# Patient Record
Sex: MALE | Race: WHITE
[De-identification: names, ages, dates, MRNs, and addresses within clinical notes are randomized per-mention and may not be internally consistent; named-entity substitution may affect disease eponyms.]

---

## 2018-08-15 ENCOUNTER — HOSPITAL ENCOUNTER (INPATIENT)
Dept: HOSPITAL 4 - SED | Age: 68
LOS: 3 days | Discharge: HOME | DRG: 74 | End: 2018-08-18
Attending: INTERNAL MEDICINE | Admitting: INTERNAL MEDICINE
Payer: COMMERCIAL

## 2018-08-15 VITALS — SYSTOLIC BLOOD PRESSURE: 135 MMHG

## 2018-08-15 VITALS — SYSTOLIC BLOOD PRESSURE: 175 MMHG

## 2018-08-15 VITALS — HEIGHT: 70 IN | BODY MASS INDEX: 33.07 KG/M2 | WEIGHT: 231 LBS

## 2018-08-15 DIAGNOSIS — N18.2: ICD-10-CM

## 2018-08-15 DIAGNOSIS — E66.01: ICD-10-CM

## 2018-08-15 DIAGNOSIS — R13.10: ICD-10-CM

## 2018-08-15 DIAGNOSIS — Z82.3: ICD-10-CM

## 2018-08-15 DIAGNOSIS — E11.42: ICD-10-CM

## 2018-08-15 DIAGNOSIS — E87.6: ICD-10-CM

## 2018-08-15 DIAGNOSIS — H53.2: ICD-10-CM

## 2018-08-15 DIAGNOSIS — E78.5: ICD-10-CM

## 2018-08-15 DIAGNOSIS — I12.9: ICD-10-CM

## 2018-08-15 DIAGNOSIS — E11.22: ICD-10-CM

## 2018-08-15 DIAGNOSIS — H49.00: ICD-10-CM

## 2018-08-15 DIAGNOSIS — Z79.899: ICD-10-CM

## 2018-08-15 DIAGNOSIS — G70.89: Primary | ICD-10-CM

## 2018-08-15 DIAGNOSIS — Z88.0: ICD-10-CM

## 2018-08-15 DIAGNOSIS — Z90.49: ICD-10-CM

## 2018-08-15 DIAGNOSIS — R20.2: ICD-10-CM

## 2018-08-15 DIAGNOSIS — Z86.010: ICD-10-CM

## 2018-08-15 DIAGNOSIS — Z98.49: ICD-10-CM

## 2018-08-15 DIAGNOSIS — M19.90: ICD-10-CM

## 2018-08-15 DIAGNOSIS — K76.0: ICD-10-CM

## 2018-08-15 DIAGNOSIS — I49.1: ICD-10-CM

## 2018-08-15 DIAGNOSIS — N40.0: ICD-10-CM

## 2018-08-15 DIAGNOSIS — M50.30: ICD-10-CM

## 2018-08-15 DIAGNOSIS — Z79.82: ICD-10-CM

## 2018-08-15 LAB
ALBUMIN SERPL BCP-MCNC: 4.1 G/DL (ref 3.4–4.8)
ALT SERPL W P-5'-P-CCNC: 45 U/L (ref 12–78)
ANION GAP SERPL CALCULATED.3IONS-SCNC: 7 MMOL/L (ref 5–15)
AST SERPL W P-5'-P-CCNC: 34 U/L (ref 10–37)
BASOPHILS # BLD AUTO: 0 K/UL (ref 0–0.2)
BASOPHILS NFR BLD AUTO: 0.4 % (ref 0–2)
BILIRUB SERPL-MCNC: 1 MG/DL (ref 0–1)
BUN SERPL-MCNC: 11 MG/DL (ref 8–21)
CALCIUM SERPL-MCNC: 9.4 MG/DL (ref 8.4–11)
CHLORIDE SERPL-SCNC: 106 MMOL/L (ref 98–107)
CREAT SERPL-MCNC: 0.89 MG/DL (ref 0.55–1.3)
EOSINOPHIL # BLD AUTO: 0.2 K/UL (ref 0–0.4)
EOSINOPHIL NFR BLD AUTO: 3.8 % (ref 0–4)
ERYTHROCYTE [DISTWIDTH] IN BLOOD BY AUTOMATED COUNT: 13.8 % (ref 9–15)
ERYTHROCYTE [SEDIMENTATION RATE] IN BLOOD BY WESTERGREN METHOD: 6 MM/HR (ref 0–15)
GFR SERPL CREATININE-BSD FRML MDRD: 109 ML/MIN (ref 90–?)
GLUCOSE SERPL-MCNC: 93 MG/DL (ref 70–99)
HCT VFR BLD AUTO: 45.6 % (ref 36–54)
HGB BLD-MCNC: 15.7 G/DL (ref 14–18)
INR PPP: 1.1 (ref 0.8–1.2)
LYMPHOCYTES # BLD AUTO: 1.7 K/UL (ref 1–5.5)
LYMPHOCYTES NFR BLD AUTO: 33.4 % (ref 20.5–51.5)
MCH RBC QN AUTO: 30 PG (ref 27–31)
MCHC RBC AUTO-ENTMCNC: 34 % (ref 32–36)
MCV RBC AUTO: 87 FL (ref 79–98)
MONOCYTES # BLD MANUAL: 0.4 K/UL (ref 0–1)
MONOCYTES # BLD MANUAL: 8 % (ref 1.7–9.3)
NEUTROPHILS # BLD AUTO: 2.7 K/UL (ref 1.8–7.7)
NEUTROPHILS NFR BLD AUTO: 54.4 % (ref 40–70)
PLATELET # BLD AUTO: 190 K/UL (ref 130–430)
POTASSIUM SERPL-SCNC: 3 MMOL/L (ref 3.5–5.1)
PROTHROMBIN TIME: 10.9 SECS (ref 9.5–12.5)
RBC # BLD AUTO: 5.24 MIL/UL (ref 4.2–6.2)
SODIUM SERPLBLD-SCNC: 143 MMOL/L (ref 136–145)
WBC # BLD AUTO: 5 K/UL (ref 4.8–10.8)

## 2018-08-15 NOTE — NUR
NEURO CHECK DONE



PATIENT WITH DROOPING OF THE LEFT EYE, PATIENT ABLE TO TALK WITH NO SLURRING OF SPEECH, ABLE 
TO TURN HEAD FROM SIDE TO SIDE WITH A LITTLE DISCOMFORT NAD PAIN LEVEL OF 2/10, AND STATED 
THAT IT IS TOLERABLE. UPPER AND LOWER EXTREMITIES WITH EQUAL  AND STRENGTH, CAN EXTEND 
AND FLEX WITHOUT PAIN DIFFICULTY. PATIENT ABLE TO WALK WITH STEADY GAIT.

## 2018-08-15 NOTE — NUR
RN ROUNDS



PATIENT ON BED SLEEPING AND RESTING BREATHING AND UNLABORED ON STABLE CONDITION NO 
COMPLAINTS NOTED WILL CONTINUE TO MONITOR CALL LIGHT WITHIN REACH.

## 2018-08-15 NOTE — NUR
ADMISSION NOTE

Received patient from ER via ben, received report from ER RN. Patient admitted with 
diagnosis of diplopia and dysarthia. Able to ambulate from doorway to bed independently 
without assistance. Patient's wife at the bedside with the patient. Patient oriented to 
hospital routine, call light, toileting and safety-patient verbalized understanding.

## 2018-08-15 NOTE — NUR
ADMISSION NOTE



RECEIVED ADMISSION REPORT FROM ADMISSION NURSE. PATIENT AWAKE AND ALERT, ABLE TO VERBALIZE 
NEEDS A/0X4, BREATHING EVEN AND UNLABORED, NO PAIN NOTED, NEUROLOGIC ASSESSMENT DONE, 
PATIENT ON STABLE CONDITION. EXPLAINED THE PLAN OF CARE AND STATED UNDERSTANDING, WITH IV ON 
THE RAC 20G. ORIENTED TO THE FACILITY AND POLICIES, WILL CONTINUE TO MONITOR CALL LIGHT 
WITHIN REACH

## 2018-08-15 NOTE — NUR
Pt AAOx4 presents to ED c/o weakness to facial muscles x 2-3 weeks. Pt was 
referred to ED by PMD. Pt denies pain, but states "Everytime I chew my food, I 
take a couple bites and I'm too tired to continue eating." Skin pink dry and 
warm, breathing even and unlabored. No other injuries/complaints per pt/noted. 
Will continue to monitor.

## 2018-08-15 NOTE — NUR
Patient will be admitted to [facility].  Admitted to Med Surg unit.  Will 
go to room 116B.  Belongings list completed.  Summary report printed. Report 
will be given at bedside.

## 2018-08-16 VITALS — SYSTOLIC BLOOD PRESSURE: 156 MMHG

## 2018-08-16 VITALS — SYSTOLIC BLOOD PRESSURE: 155 MMHG

## 2018-08-16 VITALS — SYSTOLIC BLOOD PRESSURE: 160 MMHG

## 2018-08-16 VITALS — SYSTOLIC BLOOD PRESSURE: 170 MMHG

## 2018-08-16 VITALS — SYSTOLIC BLOOD PRESSURE: 149 MMHG

## 2018-08-16 LAB
T4 FREE SERPL-MCNC: 0.7 NG/DL (ref 0.6–1.6)
TSH SERPL DL<=0.05 MIU/L-ACNC: 1.13 UIU/ML (ref 0.34–4.82)

## 2018-08-16 RX ADMIN — GABAPENTIN SCH MG: 300 CAPSULE ORAL at 21:15

## 2018-08-16 RX ADMIN — POTASSIUM CHLORIDE SCH MEQ: 20 TABLET, EXTENDED RELEASE ORAL at 10:17

## 2018-08-16 RX ADMIN — Medication SCH UNIT: at 21:15

## 2018-08-16 RX ADMIN — VALSARTAN SCH MG: 160 TABLET ORAL at 10:18

## 2018-08-16 RX ADMIN — Medication SCH UNIT: at 10:17

## 2018-08-16 RX ADMIN — PYRIDOSTIGMINE BROMIDE SCH MG: 60 TABLET ORAL at 21:15

## 2018-08-16 RX ADMIN — PYRIDOSTIGMINE BROMIDE SCH MG: 60 TABLET ORAL at 17:46

## 2018-08-16 RX ADMIN — DEXTROSE AND SODIUM CHLORIDE SCH MLS/HR: 5; 450 INJECTION, SOLUTION INTRAVENOUS at 11:59

## 2018-08-16 RX ADMIN — METOPROLOL SUCCINATE SCH MG: 50 TABLET, EXTENDED RELEASE ORAL at 10:18

## 2018-08-16 RX ADMIN — ATORVASTATIN CALCIUM SCH MG: 10 TABLET, FILM COATED ORAL at 10:17

## 2018-08-16 RX ADMIN — DOCUSATE SODIUM SCH MG: 100 CAPSULE, LIQUID FILLED ORAL at 10:17

## 2018-08-16 RX ADMIN — DEXTROSE AND SODIUM CHLORIDE SCH MLS/HR: 5; 450 INJECTION, SOLUTION INTRAVENOUS at 01:30

## 2018-08-16 RX ADMIN — PYRIDOSTIGMINE BROMIDE SCH MG: 60 TABLET ORAL at 13:44

## 2018-08-16 RX ADMIN — ASPIRIN SCH MG: 81 TABLET, COATED ORAL at 10:17

## 2018-08-16 RX ADMIN — POTASSIUM CHLORIDE SCH MEQ: 20 TABLET, EXTENDED RELEASE ORAL at 21:14

## 2018-08-16 NOTE — NUR
Closing Note

Patient is currently resting in bed. IV is on the RAC20g running D51/2NS@90. Patient has 
been in stable condition throughout the shift. Call light is within reach and bed is in low 
position. Will endorse care to the oncoming nurse.

## 2018-08-16 NOTE — NUR
RN ROUNDS



PATIENT ON BED SLEEPING AND RESTING, ON STABLE CONDITION, NO COMPLAINTS NOTED AS OF NOW, 
WILL CONTINUE TO MONITOR. CALL LIGHT WITHIN REACH.

## 2018-08-16 NOTE — NUR
RN ROUNDS



PATIENT SLEEPING AND RESTING, ON STABLE CONDITION, BREATHING EVEN AND UNLABORED, WILL 
CONTINUE TO MONITOR.

## 2018-08-16 NOTE — NUR
Rounds

Patient is currently resting in bed. No signs distress noted at the moment. Will continue to 
monitor.

## 2018-08-16 NOTE — NUR
MED PASS



DUE MEDICATIONS GIVEN. ASPIRATION PRECAUTIONS OBSERVED. DENIES ANY PAIN. CALL LIGHT WITHIN 
EASY REACH.

## 2018-08-16 NOTE — NUR
Cardiac consult called:

for Dr. Brian, regarding abnormal EKG, possible CVA, ordered by Dr. Lee, spoke with 
Oliver.

## 2018-08-16 NOTE — NUR
OPENING NOTES



RECEIVED PATIENT IN BED AAO X4. BREATHING UNLABORED ON ROOM AIR. DENIES ANY PAIN. IVF 
INFUSING AS ORDERED. IV LINE INTACT TO RT AC. PLAN OF CARE REVIEWED WITH PATIENT. CALL LIGHT 
WITHIN EASY REACH. BED IN LOWEST LOCKED POSITION.

## 2018-08-16 NOTE — NUR
RN ROUNDS/NEURO CHECK



PATIENT ON BED SLEEPING AND RESTING, BREATHING EVEN AND UNLABORED, NO SOB NOTED, NEURO CHECK 
DONE SAFETY MAINTAINED CALL LIGHT WITHIN REACH.

## 2018-08-16 NOTE — NUR
Opening Note

Report received from Surinder ROBERTS. Patient is currently resting in bed. No signs of facial 
paralysis noted at the moment. IV is on the RAC 20g saline locked. No signs of distress 
noted. Call light is within reach and bed is in low position. Will continue to monitor.

## 2018-08-16 NOTE — NUR
PATIENT AMBULATED TO THE BATHROOM



PATIENT AMBULATED TO THE BATHROOM TOLERATING WELL WITH STEADY GAIT, UPPER AND LOWER 
EXTREMITIES WITH ACTIVE ROM, STILL WITH FACIAL DROOP ON THE LEFT SIDE OF THE FACE, SPEECH IS 
CLEAR AND UNDERSTANDABLE, ABLE TO MAKE NEEDS KNOWN.

## 2018-08-16 NOTE — NUR
DR. UGALDE



SPOKE WITH DR UGALDE REGARDING ORDER FOR FINGER STICK GLUCOSE. MD ORDERED AC/HS. PER MD 
PATIENT HAS SINUS INFECTION ORDERED TO START LEVAQUIN 500 MG PO DAILY.

## 2018-08-16 NOTE — NUR
CLOSING NOTES



PATIENT ON BED SLEEPING AND RESTING, BREATHING EVEN AND UNLABORED, MAINTAINED POSITION OF 
COMFORT AND SAFETY BED ON THE LOWEST POSITION, BED ALARM MAINTAINED. ENCOURAGED DEEP 
BREATHING AND RELAXATION, NO PAIN NOTED, EDUCATION GIVEN, NEURO CHECKS DONE. IV CLEAN DRY 
INTACT. WILL GIVE REPORT TO AM NURSE, WILL CONTINUE TO MONITOR CALL LIGHT WITHIN REACH

## 2018-08-16 NOTE — NUR
Rounds

Patient is sitting up in bed. Swallow eval is pending and will be done tomorrow. Call light 
is within reach and bed is in low position.

## 2018-08-17 VITALS — SYSTOLIC BLOOD PRESSURE: 152 MMHG

## 2018-08-17 VITALS — SYSTOLIC BLOOD PRESSURE: 124 MMHG

## 2018-08-17 VITALS — SYSTOLIC BLOOD PRESSURE: 142 MMHG

## 2018-08-17 VITALS — SYSTOLIC BLOOD PRESSURE: 149 MMHG

## 2018-08-17 VITALS — SYSTOLIC BLOOD PRESSURE: 157 MMHG

## 2018-08-17 LAB
ANION GAP SERPL CALCULATED.3IONS-SCNC: 6 MMOL/L (ref 5–15)
BUN SERPL-MCNC: 7 MG/DL (ref 8–21)
CALCIUM SERPL-MCNC: 9.1 MG/DL (ref 8.4–11)
CHLORIDE SERPL-SCNC: 108 MMOL/L (ref 98–107)
CREAT SERPL-MCNC: 0.73 MG/DL (ref 0.55–1.3)
GFR SERPL CREATININE-BSD FRML MDRD: 137 ML/MIN (ref 90–?)
GLUCOSE SERPL-MCNC: 149 MG/DL (ref 70–99)
POTASSIUM SERPL-SCNC: 2.9 MMOL/L (ref 3.5–5.1)
SODIUM SERPLBLD-SCNC: 141 MMOL/L (ref 136–145)

## 2018-08-17 RX ADMIN — Medication SCH UNIT: at 20:48

## 2018-08-17 RX ADMIN — Medication SCH UNIT: at 08:58

## 2018-08-17 RX ADMIN — DEXTROSE AND SODIUM CHLORIDE SCH MLS/HR: 5; 450 INJECTION, SOLUTION INTRAVENOUS at 06:26

## 2018-08-17 RX ADMIN — ASPIRIN SCH MG: 81 TABLET, COATED ORAL at 08:59

## 2018-08-17 RX ADMIN — TAMSULOSIN HYDROCHLORIDE SCH MG: 0.4 CAPSULE ORAL at 09:01

## 2018-08-17 RX ADMIN — Medication SCH EA: at 11:31

## 2018-08-17 RX ADMIN — PANTOPRAZOLE SODIUM SCH MG: 40 TABLET, DELAYED RELEASE ORAL at 06:38

## 2018-08-17 RX ADMIN — DOCUSATE SODIUM SCH MG: 100 CAPSULE, LIQUID FILLED ORAL at 08:58

## 2018-08-17 RX ADMIN — PYRIDOSTIGMINE BROMIDE SCH MG: 60 TABLET ORAL at 16:38

## 2018-08-17 RX ADMIN — Medication SCH EA: at 16:38

## 2018-08-17 RX ADMIN — PYRIDOSTIGMINE BROMIDE SCH MG: 60 TABLET ORAL at 20:47

## 2018-08-17 RX ADMIN — ATORVASTATIN CALCIUM SCH MG: 10 TABLET, FILM COATED ORAL at 08:59

## 2018-08-17 RX ADMIN — POTASSIUM CHLORIDE SCH MEQ: 20 TABLET, EXTENDED RELEASE ORAL at 14:47

## 2018-08-17 RX ADMIN — METOPROLOL SUCCINATE SCH MG: 50 TABLET, EXTENDED RELEASE ORAL at 09:00

## 2018-08-17 RX ADMIN — Medication SCH EA: at 06:40

## 2018-08-17 RX ADMIN — POTASSIUM CHLORIDE SCH MEQ: 20 TABLET, EXTENDED RELEASE ORAL at 20:48

## 2018-08-17 RX ADMIN — PYRIDOSTIGMINE BROMIDE SCH MG: 60 TABLET ORAL at 08:58

## 2018-08-17 RX ADMIN — PYRIDOSTIGMINE BROMIDE SCH MG: 60 TABLET ORAL at 14:47

## 2018-08-17 RX ADMIN — POTASSIUM CHLORIDE SCH MEQ: 20 TABLET, EXTENDED RELEASE ORAL at 08:58

## 2018-08-17 RX ADMIN — GABAPENTIN SCH MG: 300 CAPSULE ORAL at 20:47

## 2018-08-17 RX ADMIN — VALSARTAN SCH MG: 160 TABLET ORAL at 08:59

## 2018-08-17 RX ADMIN — Medication SCH EA: at 20:53

## 2018-08-17 RX ADMIN — LEVOFLOXACIN SCH MG: 500 TABLET, FILM COATED ORAL at 09:04

## 2018-08-17 NOTE — NUR
opening note

pt laying in bed awake alert, denies any chest pain/ shortness of breath. pt stated he is 
still having double vision, but is refusing the bed alarm. pt encouraged to call for help 
during ambulation. bed in lowest position, with call light in reach.

## 2018-08-17 NOTE — NUR
accuckeck/ med pass

blood sugar check-157 - 2 units reg insulin given. 



mestinon po given as well patient tolerated well, no distress noted.

## 2018-08-17 NOTE — NUR
blood sugar check

blood sugar checked- 130 no coverage needed. no distress noted. pt still refusing bed alarm.

## 2018-08-17 NOTE — NUR
Dietitian Recommendations 



* Recommend CCHO, cardiac, mechanical soft, chopped diet

LP, RD



Please refer to Nutrition Assessment for details.

## 2018-08-17 NOTE — NUR
am meds

morning meds given, po pt tolerated well, no distress noted, pt took one by one. safety 
maintained. pt still refusing bed alarm- pt encouraged to call for help during ambulation. 
pt stated he is still having double vision. bed in lowest position, with call light visibly 
within reach.

## 2018-08-17 NOTE — NUR
Blood sugar check/Pain mgmt

Blood sugar check 108, no insulin indicated per protocol.  Pt able to take Potassium pill 
crushed with applesauce.  Aspiration precaution in place.  Pt c/o back neck pain 3/10, 
offered Tylenol but pt refused.  States he will try to get some rest.  Encouraged to call if 
he changed his mind.  Pt agreeable.  Call light within reach.  To monitor.

## 2018-08-17 NOTE — NUR
Rounds

Pt asleep, easily arousable.  Pt denies any pain at this time.  Call light within easy 
reach.  Will continue to monitor.

## 2018-08-17 NOTE — NUR
Nutrition Update



Erasmo Scale 18 noted.

Pt admitted for diplopia, dysarthria.

Diet: mechanical soft

BMI: 33.1 kg/m2



RD to follow per nutrition care standards.

## 2018-08-17 NOTE — NUR
ROUNDS



PATIENT RESTING IN BED. IVF CONT. INFUSING IV LINE INTACT. CALL LIGHT WITHIN EASY REACH. 
VITAL SIGNS STABLE.

## 2018-08-17 NOTE — NUR
MED PASS 

MESTINON PO GIVEN, 40 MEQ POTASSIUM PO GIVEN AS WELL, PT TOLERATED. WELL NO DISTRESS NOTED. 
SAFETY MAINTAINED.

## 2018-08-17 NOTE — NUR
Opening note

Pt AAOx4, no acute distress or discomfort noted.  IV R. AC 20G clear, patent.  Call 
light/items within reach.  Pt anxious to go home.  Pt reminded re home med Flomax need to be 
used from home per Pharmacist.  Pt refused bed alarm.  Will continue to monitor pt.

## 2018-08-17 NOTE — NUR
ROUNDS



PATIENT RESTING IN BED. NO DISTRESS NOTED. IVF CONTINUOUSLY INFUSING AS ORDERED. CALL LIGHT 
WITHIN REACH.

## 2018-08-17 NOTE — NUR
[id number]

Swallow evaluation completed and reviewed recommendations with pt and ARMANDO Mcnally.  Recommend 
mechanical soft chopped and thin liquids, advance as tolerated.  Pls see evaluation for 
details. 



[id number]  ch

[id number]  ch

[id number]  ch

## 2018-08-18 VITALS — SYSTOLIC BLOOD PRESSURE: 156 MMHG

## 2018-08-18 VITALS — SYSTOLIC BLOOD PRESSURE: 152 MMHG

## 2018-08-18 VITALS — SYSTOLIC BLOOD PRESSURE: 157 MMHG

## 2018-08-18 LAB
ANION GAP SERPL CALCULATED.3IONS-SCNC: 5 MMOL/L (ref 5–15)
BASOPHILS # BLD AUTO: 0 K/UL (ref 0–0.2)
BASOPHILS NFR BLD AUTO: 0.7 % (ref 0–2)
BUN SERPL-MCNC: 7 MG/DL (ref 8–21)
CALCIUM SERPL-MCNC: 9.3 MG/DL (ref 8.4–11)
CHLORIDE SERPL-SCNC: 108 MMOL/L (ref 98–107)
CHOLEST SERPL-MCNC: 129 MG/DL (ref ?–200)
CREAT SERPL-MCNC: 1.01 MG/DL (ref 0.55–1.3)
EOSINOPHIL # BLD AUTO: 0.2 K/UL (ref 0–0.4)
EOSINOPHIL NFR BLD AUTO: 3 % (ref 0–4)
ERYTHROCYTE [DISTWIDTH] IN BLOOD BY AUTOMATED COUNT: 14.8 % (ref 9–15)
GFR SERPL CREATININE-BSD FRML MDRD: 94 ML/MIN (ref 90–?)
GLUCOSE SERPL-MCNC: 108 MG/DL (ref 70–99)
HCT VFR BLD AUTO: 49.4 % (ref 36–54)
HDLC SERPL-MCNC: 48 MG/DL (ref 45–?)
HGB BLD-MCNC: 16.2 G/DL (ref 14–18)
LDL CHOLESTEROL: 68 MG/DL (ref ?–100)
LYMPHOCYTES # BLD AUTO: 1.9 K/UL (ref 1–5.5)
LYMPHOCYTES NFR BLD AUTO: 30.8 % (ref 20.5–51.5)
MCH RBC QN AUTO: 29 PG (ref 27–31)
MCHC RBC AUTO-ENTMCNC: 33 % (ref 32–36)
MCV RBC AUTO: 87 FL (ref 79–98)
MONOCYTES # BLD MANUAL: 0.5 K/UL (ref 0–1)
MONOCYTES # BLD MANUAL: 8.3 % (ref 1.7–9.3)
NEUTROPHILS # BLD AUTO: 3.6 K/UL (ref 1.8–7.7)
NEUTROPHILS NFR BLD AUTO: 57.2 % (ref 40–70)
PLATELET # BLD AUTO: 198 K/UL (ref 130–430)
POTASSIUM SERPL-SCNC: 3.5 MMOL/L (ref 3.5–5.1)
RBC # BLD AUTO: 5.68 MIL/UL (ref 4.2–6.2)
SODIUM SERPLBLD-SCNC: 142 MMOL/L (ref 136–145)
TRIGL SERPL-MCNC: 100 MG/DL (ref 30–150)
WBC # BLD AUTO: 6.2 K/UL (ref 4.8–10.8)

## 2018-08-18 RX ADMIN — ASPIRIN SCH MG: 81 TABLET, COATED ORAL at 08:40

## 2018-08-18 RX ADMIN — LEVOFLOXACIN SCH MG: 500 TABLET, FILM COATED ORAL at 10:38

## 2018-08-18 RX ADMIN — ATORVASTATIN CALCIUM SCH MG: 10 TABLET, FILM COATED ORAL at 08:39

## 2018-08-18 RX ADMIN — Medication SCH EA: at 06:20

## 2018-08-18 RX ADMIN — TAMSULOSIN HYDROCHLORIDE SCH MG: 0.4 CAPSULE ORAL at 08:39

## 2018-08-18 RX ADMIN — METOPROLOL SUCCINATE SCH MG: 50 TABLET, EXTENDED RELEASE ORAL at 08:41

## 2018-08-18 RX ADMIN — DOCUSATE SODIUM SCH MG: 100 CAPSULE, LIQUID FILLED ORAL at 08:39

## 2018-08-18 RX ADMIN — PYRIDOSTIGMINE BROMIDE SCH MG: 60 TABLET ORAL at 08:39

## 2018-08-18 RX ADMIN — PANTOPRAZOLE SODIUM SCH MG: 40 TABLET, DELAYED RELEASE ORAL at 06:18

## 2018-08-18 RX ADMIN — Medication SCH UNIT: at 08:39

## 2018-08-18 RX ADMIN — VALSARTAN SCH MG: 160 TABLET ORAL at 08:41

## 2018-08-18 RX ADMIN — POTASSIUM CHLORIDE SCH MEQ: 20 TABLET, EXTENDED RELEASE ORAL at 08:39

## 2018-08-18 NOTE — NUR
D/C Patient

Patient given medication reconciliation form and transitional care instructions. Exit Care 
provided. Patient verbalized understanding. MD discussed with patient the results and 
treatment provided. Ambulatory with steady gait for discharge to home. Patient in stable 
condition, ID band removed. IV catheter removed, intact and dressing applied, no active 
bleeding. Rx for k-dur and Mestinon given. Patient educated on pain management. All 
belongings sent with patient.

## 2018-08-18 NOTE — NUR
oPENING NOTES

RECEIVED PT IN BED, PT IS AAOX4, DENIES PAIN, , AFBRILE, PT STATED HE FEELS BETTER 
NOW AND DENIES VERTICAL DOUBLE VISION THIS TIME. SL LOCK ON R. AC INTACT.

SAFETY PRECAUTION IN PLACE. CALL LIGHT IN REACH, BED IN LOWEST POSITION. PT AMBULATORY WITH 
STEADY GAT, REFUSED BED ALARM. WILL CONT TO MONITOR.

## 2018-08-18 NOTE — NUR
Rounds

Pt asleep.  No s/s distress or discomforted noted.  Call light within reach.  Will continure 
to monitor pt.

## 2018-08-18 NOTE — NUR
rounds

pt in bed, denies pain, no sob, no distress. call light in easy reach, bed in low position. 
will cont to monitor.

## 2018-08-18 NOTE — NUR
Closing note

Pt asleep, no c/o pain at this time.  Blood sugar checked 121, no insulin indicated at this 
time.  Call light within reach.  Will continue to monitor.  To endorse to am nurse.

## 2021-02-21 NOTE — NUR
[facility] Hospitalist Inpatient History & Physical      Chief Complaint   Patient presents with   • Headache New Onset on New Symptom     seen at clinic sent here for eval. x4 days       History Of Present Illness  Heriberto Ko is a 45 year old male with PMH of AVR (due to AR and ASD corrected at the time ) with mechanical valve in 2017, HTN who comes for 4 days of bilateral intense headache w/o blurred vision, N/V or weakness. CT showed 4mm SDH with MLS and hence being admitted. Only head trauma he recalls was 2 months ago he walked into pipe and bump his head but no LOC and major issue at the time.       Past Medical History  Past Medical History:   Diagnosis Date   • ASD (atrial septal defect)    • HTN (hypertension)         Surgical History  Past Surgical History:   Procedure Laterality Date   • Aortic valve replacement      mechanical   • Asd repair          Social History  Social History     Tobacco Use   • Smoking status: Never Smoker   • Smokeless tobacco: Never Used   Substance Use Topics   • Alcohol use: Never     Frequency: Never   • Drug use: Never       Family History  History reviewed. No pertinent family history.     Allergies  ALLERGIES:  Patient has no known allergies.    Medications  (Not in a hospital admission)      Current Facility-Administered Medications   Medication Dose Route Frequency Provider Last Rate Last Admin   • fentaNYL (SUBLIMAZE) injection 50 mcg  50 mcg Intravenous Once Dylan ENCISO MD         Current Outpatient Medications   Medication Sig Dispense Refill   • spironolactone (ALDACTONE) 25 MG tablet TAKE 1 TABLET BY MOUTH EVERY DAY 30 tablet 3   • famotidine (PEPCID) 20 MG tablet Take 20 mg by mouth every evening.     • warfarin (COUMADIN) 7.5 MG tablet Take 11.25 mg by mouth 3 days a week. Takes 11.25mg on Sun, Tue, Fri     • warfarin (COUMADIN) 7.5 MG tablet Take 7.5 mg by mouth 4 days a week. Takes 7.5mg on mon, wed, thur, sat     • amLODIPine (NORVASC) 5 MG  CLOSING NOTES



PATIENT AWAKE RESTING IN BED. BREATHING UNLABORED ON ROOM AIR. IV LINE INTACT WITH IVF 
INFUSING. NEEDS ATTENDED. PLACED CALL LIGHT WITHIN EASY REACH.  BED IN LOWEST LOCKED 
POSITION. tablet Take 1 tablet by mouth 2 times daily. 90 tablet 1   • losartan (COZAAR) 100 MG tablet Take 1 tablet by mouth daily. 90 tablet 1   • carvedilol (COREG) 12.5 MG tablet Take 1 tablet by mouth 2 times daily. 180 tablet 1   • Multiple Vitamins-Minerals (Therapeutic-M/Lutein) Tab Take 1 tablet by mouth daily. 90 tablet 3   • atorvastatin (LIPITOR) 20 MG tablet Take 1 tablet by mouth daily. (Patient taking differently: Take 20 mg by mouth nightly. ) 90 tablet 1   • HYDROcodone-acetaminophen (NORCO) 5-325 MG per tablet Take 1 tablet by mouth 2 times daily as needed.          Review of Systems    Constitutional: Negative for fever, chills, change in appetite or fatigue.  Skin: Negative for rash or wounds.  HEENT: Negative for eye drainage, rhinorrhea, ear pain, sore throat or neck pain.  Respiratory: Negative cough, wheezing or shortness of breath.  Cardiovascular: Negative for chest pain, chest pressure, palpitations or diaphoresis.  Gastrointestinal: Negative for nausea, vomiting, diarrhea, abdominal pain, black or tarry stools.  Genitourinary: Negative for dysuria, urgency, frequency, hematuria or flank pain.  Extremities:  Negative for joint swelling or joint pain.  Neurologic:  Negative for change in sensory or motor function.  Negative for headache, change in gait, vertigo, vision or speech.  Endocrine: Negative for heat or cold intolerance, weight loss or gain.  Hematological: Negative for bleeding, bruising or lymphadenopathy.  Psychiatric: Negative for change in affect, anxiety, depression, mentation or sleep disturbance.      Last Recorded Vitals  Vitals with min/max:  Body mass index is 37.3 kg/m².    Vital Last Value 24 Hour Range   Temperature 98.4 °F (36.9 °C) (02/20/21 1300) Temp  Min: 98.3 °F (36.8 °C)  Max: 98.4 °F (36.9 °C)   Pulse (!) 48 (02/20/21 1711) Pulse  Min: 48  Max: 99   Respiratory 16 (02/20/21 1711) Resp  Min: 13  Max: 20   Non-Invasive  Blood Pressure 135/82 (02/20/21 1711) BP  Min:  135/82  Max: 182/96   Pulse Oximetry 98 % (02/20/21 1711) SpO2  Min: 98 %  Max: 99 %   Arterial   Blood Pressure   No data recorded      Physical Exam  General:  NAD   HEENT: NCAT, PERRLA, EOMI, No phargyngeal erythema   Mouth:  normal.   Neck:  No adenopathy, no JVD.     Chest:  CTAB   Cardiac:   RRR no murmur Mechanical click  Abdomen:  Soft, NT, ND Normal BS   Musculoskeletal:  Good range of motion  Vascular:  No Edema.  Peripheral pulses normal and equal in all extremities.    Neuro:  Alert and oriented x 3.  No focal sensory or strength deficits.     Skin: No rashes noted   Ext: No c/c/e     Imaging  CT HEAD WO CONTRAST   Final Result      1. Small-volume mixed-density subdural hematoma along the left cerebral   convexity measuring up to 4 mm in thickness. Additional minimal subdural   hemorrhage along the cerebral falx and left tentorial leaf.      2. Rightward midline shift measuring 3-4 mm. Suspected elevated   intracranial pressure.       Findings were conveyed to Dr. Feng via telephone on 2/20/2021 3:10 PM.      Electronically Signed by: ANGELITA CRAWFORD MD    Signed on: 2/20/2021 3:13 PM                   Labs   Recent Results (from the past 24 hour(s))   POCT URINE DIP NON-AUTO    Collection Time: 02/20/21 11:58 AM   Result Value Ref Range    POCT Color Yellow     POCT Appearance Clear     POCT Glucose Urine Negative Negative    POCT Bilirubin Negative Negative    POCT Ketones Negative Negative    POCT Specific Gravity 1.025 1.005 - 1.03    POCT Occult Blood Trace - Intact Negative    POCT pH 6.0 5 - 9    POCT Protein Negative Negative    POCT Urobilinogen 1.0 0 - 1 mg/dL    Urine Nitrite Negative Negative    WBC (Leukocyte) Esterase POC Negative Negative   POCT INFLUENZA A/B    Collection Time: 02/20/21 11:58 AM   Result Value Ref Range    Rapid Influenza A Ag Negative Negative, Indeterminate    Rapid Influenza B Ag Negative Negative, Indeterminate   COVID DIAGNOSTIC TEST    Collection Time: 02/20/21  11:58 AM    Specimen: Nasal; Swab   Result Value Ref Range    POCT SARS-COV-2 ANTIGEN Not Detected Not Detected   Prothrombin Time    Collection Time: 02/20/21  2:04 PM   Result Value Ref Range    Prothrombin Time 18.1 (H) 9.7 - 11.8 sec    INR 1.7 <=5.0   Partial Thromboplastin Time    Collection Time: 02/20/21  2:04 PM   Result Value Ref Range    PTT 34 (H) 22 - 30 sec   Comprehensive Metabolic Panel    Collection Time: 02/20/21  2:04 PM   Result Value Ref Range    Fasting Status      Sodium 139 135 - 145 mmol/L    Potassium 4.2 3.4 - 5.1 mmol/L    Chloride 107 98 - 107 mmol/L    Carbon Dioxide 29 21 - 32 mmol/L    Anion Gap 7 (L) 10 - 20 mmol/L    Glucose 106 (H) 65 - 99 mg/dL    BUN 13 6 - 20 mg/dL    Creatinine 1.03 0.67 - 1.17 mg/dL    Glomerular Filtration Rate >90 >90 mL/min/1.73m2    BUN/ Creatinine Ratio 13 7 - 25    Calcium 9.4 8.4 - 10.2 mg/dL    Bilirubin, Total 0.8 0.2 - 1.0 mg/dL    GOT/AST 18 <=37 Units/L    GPT/ALT 23 <64 Units/L    Alkaline Phosphatase 66 45 - 117 Units/L    Albumin 4.0 3.6 - 5.1 g/dL    Protein, Total 7.3 6.4 - 8.2 g/dL    Globulin 3.3 2.0 - 4.0 g/dL    A/G Ratio 1.2 1.0 - 2.4   CBC with Automated Differential (performable only)    Collection Time: 02/20/21  2:04 PM   Result Value Ref Range    WBC 5.4 4.2 - 11.0 K/mcL    RBC 4.12 (L) 4.50 - 5.90 mil/mcL    HGB 12.1 (L) 13.0 - 17.0 g/dL    HCT 37.8 (L) 39.0 - 51.0 %    MCV 91.7 78.0 - 100.0 fl    MCH 29.4 26.0 - 34.0 pg    MCHC 32.0 32.0 - 36.5 g/dL    RDW-CV 13.2 11.0 - 15.0 %    RDW-SD 44.0 39.0 - 50.0 fL     140 - 450 K/mcL    NRBC 0 <=0 /100 WBC    Neutrophil, Percent 73 %    Lymphocytes, Percent 17 %    Mono, Percent 8 %    Eosinophils, Percent 1 %    Basophils, Percent 1 %    Immature Granulocytes 0 %    Absolute Neutrophils 3.9 1.8 - 7.7 K/mcL    Absolute Lymphocytes 0.9 (L) 1.0 - 4.8 K/mcL    Absolute Monocytes 0.5 0.3 - 0.9 K/mcL    Absolute Eosinophils  0.1 0.0 - 0.5 K/mcL    Absolute Basophils 0.0 0.0 - 0.3 K/mcL     Absolute Immmature Granulocytes 0.0 0.0 - 0.2 K/mcL   Rapid SARS-CoV-2 by PCR    Collection Time: 02/20/21  3:53 PM    Specimen: Nasopharyngeal; Swab   Result Value Ref Range    Rapid SARS-COV-2 by PCR Not Detected Not Detected / Detected / Presumptive Positive / Inhibitors present    Isolation Guidelines      Procedural Comment             Assessment and Plan  Active Problems:    Essential hypertension    Warfarin anticoagulation    Class 2 obesity due to excess calories without serious comorbidity in adult    SDH (subdural hematoma) (CMS/HCC)    S/P AVR (aortic valve replacement)     Left Subdural hematoma <1cm with MLS by report  Mechanical AVR  Uncontrolled HTN  Bradycardia due to coreg  Obese  Discussed with NeuroSx and Cards  Hold warfarin and clinical f/u.   Restart his coreg, losartan, amlodipine, aldactone        DVT prophylaxis:SCD   Diet:  Code status:  Code Status Information     Code Status    Not on file        Activity:   Active Orders   Consult    Inpatient consult to Cardiology     Frequency: 1 Time     Number of Occurrences: 1 Occurrences    Inpatient consult to Neurosurgery     Frequency: 1 Time     Number of Occurrences: 1 Occurrences   Medications    fentaNYL (SUBLIMAZE) injection 50 mcg     Frequency: Once     Dose: 50 mcg     Route: Intravenous     Disposition:  Level of care:   Communication: discussed plan of care with nurse, patient  PCP: informed Aurelia Vazquez MD of patient admission by [facility]    Greater than 50% of the time spent reviewing the patient records, coordinating patient care plan and discussing the above care plan  with the patient.    Leander Ch MD  2/20/2021